# Patient Record
Sex: FEMALE | Race: OTHER | Employment: OTHER | URBAN - METROPOLITAN AREA
[De-identification: names, ages, dates, MRNs, and addresses within clinical notes are randomized per-mention and may not be internally consistent; named-entity substitution may affect disease eponyms.]

---

## 2020-11-10 NOTE — PATIENT DISCUSSION
REFER TO DR. RAND FOR FURTHER RETINAL EVALUATION. VERY LIMITED VIEWS SECONDARY TO PATIENT POSITIONING. PATIENT IS UNABLE TO SIT UPRIGHT.

## 2020-11-12 NOTE — PATIENT DISCUSSION
DARKENED VISION EPISODE LIKELY DUE TO BLOOD FLOW. RETINA APPEARS TO BE IN GOOD SHAPE. RETURN TO DR Rizwan Cervantes FOR GLAUCOMA EVAL. TESTING.

## 2021-08-04 ENCOUNTER — NEW PATIENT COMPREHENSIVE (OUTPATIENT)
Dept: URBAN - METROPOLITAN AREA CLINIC 47 | Facility: CLINIC | Age: 61
End: 2021-08-04

## 2021-08-04 DIAGNOSIS — H25.813: ICD-10-CM

## 2021-08-04 DIAGNOSIS — H52.7: ICD-10-CM

## 2021-08-04 PROCEDURE — 92004 COMPRE OPH EXAM NEW PT 1/>: CPT

## 2021-08-04 PROCEDURE — 92015 DETERMINE REFRACTIVE STATE: CPT

## 2021-08-04 ASSESSMENT — VISUAL ACUITY
OD_SC: 20/20-1
OS_SC: J12
OS_SC: 20/20
OD_SC: J12

## 2021-08-04 ASSESSMENT — TONOMETRY
OS_IOP_MMHG: 15
OD_IOP_MMHG: 15

## 2022-08-11 ENCOUNTER — COMPREHENSIVE EXAM (OUTPATIENT)
Dept: URBAN - METROPOLITAN AREA CLINIC 47 | Facility: CLINIC | Age: 62
End: 2022-08-11

## 2022-08-11 DIAGNOSIS — H52.7: ICD-10-CM

## 2022-08-11 DIAGNOSIS — H25.813: ICD-10-CM

## 2022-08-11 PROCEDURE — 92015 DETERMINE REFRACTIVE STATE: CPT

## 2022-08-11 PROCEDURE — 92014 COMPRE OPH EXAM EST PT 1/>: CPT

## 2022-08-11 ASSESSMENT — VISUAL ACUITY
OS_SC: 20/20
OD_SC: J3
OS_CC: J1
OS_SC: J3
OD_CC: J1
OD_SC: 20/20

## 2022-08-11 ASSESSMENT — TONOMETRY
OS_IOP_MMHG: 17
OD_IOP_MMHG: 16

## 2023-08-15 ENCOUNTER — COMPREHENSIVE EXAM (OUTPATIENT)
Dept: URBAN - METROPOLITAN AREA CLINIC 47 | Facility: CLINIC | Age: 63
End: 2023-08-15

## 2023-08-15 DIAGNOSIS — H43.813: ICD-10-CM

## 2023-08-15 DIAGNOSIS — H52.7: ICD-10-CM

## 2023-08-15 DIAGNOSIS — H25.813: ICD-10-CM

## 2023-08-15 PROCEDURE — 92015 DETERMINE REFRACTIVE STATE: CPT

## 2023-08-15 PROCEDURE — 92014 COMPRE OPH EXAM EST PT 1/>: CPT

## 2023-08-15 ASSESSMENT — VISUAL ACUITY
OS_SC: 20/20
OD_SC: 20/20
OS_CC: J1+
OD_SC: J2+
OD_CC: J1+
OS_SC: J2-

## 2023-08-15 ASSESSMENT — TONOMETRY
OD_IOP_MMHG: 16
OS_IOP_MMHG: 17

## 2024-08-13 ENCOUNTER — COMPREHENSIVE EXAM (OUTPATIENT)
Dept: URBAN - METROPOLITAN AREA CLINIC 47 | Facility: CLINIC | Age: 64
End: 2024-08-13

## 2024-08-13 DIAGNOSIS — H52.7: ICD-10-CM

## 2024-08-13 PROCEDURE — 92015 DETERMINE REFRACTIVE STATE: CPT

## 2024-08-13 PROCEDURE — 92014 COMPRE OPH EXAM EST PT 1/>: CPT

## 2024-08-13 ASSESSMENT — VISUAL ACUITY
OU_SC: 20/20
OS_CC: J1
OD_SC: 20/20
OS_SC: 20/20
OD_SC: J4
OU_SC: J4
OS_SC: J6
OD_CC: J1
OU_CC: J1

## 2024-08-13 ASSESSMENT — TONOMETRY
OD_IOP_MMHG: 15
OS_IOP_MMHG: 16

## 2025-08-13 ENCOUNTER — COMPREHENSIVE EXAM (OUTPATIENT)
Age: 65
End: 2025-08-13

## 2025-08-13 DIAGNOSIS — H52.7: ICD-10-CM

## 2025-08-13 PROCEDURE — 92015 DETERMINE REFRACTIVE STATE: CPT

## 2025-08-13 PROCEDURE — 92014 COMPRE OPH EXAM EST PT 1/>: CPT
